# Patient Record
Sex: MALE | Race: WHITE | NOT HISPANIC OR LATINO | Employment: STUDENT | URBAN - METROPOLITAN AREA
[De-identification: names, ages, dates, MRNs, and addresses within clinical notes are randomized per-mention and may not be internally consistent; named-entity substitution may affect disease eponyms.]

---

## 2024-10-18 ENCOUNTER — OFFICE VISIT (OUTPATIENT)
Dept: PODIATRY | Facility: CLINIC | Age: 24
End: 2024-10-18
Payer: MEDICAID

## 2024-10-18 VITALS
RESPIRATION RATE: 16 BRPM | WEIGHT: 295 LBS | HEART RATE: 89 BPM | HEIGHT: 76 IN | BODY MASS INDEX: 35.92 KG/M2 | OXYGEN SATURATION: 97 %

## 2024-10-18 DIAGNOSIS — L60.0 INGROWN TOENAIL OF BOTH FEET: Primary | ICD-10-CM

## 2024-10-18 DIAGNOSIS — S90.423S: ICD-10-CM

## 2024-10-18 RX ORDER — SERTRALINE HYDROCHLORIDE 100 MG/1
100 TABLET, FILM COATED ORAL EVERY EVENING
COMMUNITY
Start: 2024-09-28

## 2024-10-21 NOTE — PROGRESS NOTES
"10/18/2024  Foot and Ankle Surgery - Established Patient/Follow-up  Provider: JOHN Balderas   Location: [facility]    Subjective:  Joey Rowley is a 24 y.o. male.     Chief Complaint   Patient presents with    Left Foot - Ingrown Toenail, Initial Evaluation    Right Foot - Ingrown Toenail, Initial Evaluation    Initial Evaluation     PCP: Ernesto Noel last visit 6/16/2024       History of Present Illness  The patient is a 24 year old male who presents for evaluation of toenail problems.    He reports experiencing issues with his toenails, suspecting them to be ingrown. He recalls a similar issue seven years ago, which was addressed by a podiatrist who removed the problematic nails. He has been attempting to alleviate the pressure on his toes by cutting down the sides of his nails. His right toe is causing him more discomfort than the left, although he describes the pain as mild.    Additionally, he has developed blisters on his toes, which he attributes to a recent long walk in new shoes. He feels the blisters have healed.     He also mentions having flat feet and frequent ankle sprains.       Allergies   Allergen Reactions    Phenytoin Hives       Current Outpatient Medications on File Prior to Visit   Medication Sig Dispense Refill    Cetirizine HCl 10 MG capsule Take  by mouth.      sertraline (ZOLOFT) 100 MG tablet Take 1 tablet by mouth Every Evening.       No current facility-administered medications on file prior to visit.       Objective   Pulse 89   Resp 16   Ht 193 cm (76\")   Wt 134 kg (295 lb)   SpO2 97%   BMI 35.91 kg/m²     Foot/Ankle Exam    GENERAL  Appearance:  appears stated age  Orientation:  AAOx3  Affect:  appropriate  Gait:  unimpaired  Assistance:  independent  Right shoe gear: casual shoe and sock  Left shoe gear: casual shoe and sock    VASCULAR     Right Foot Vascularity   Dorsalis pedis:  2+  Skin temperature:  warm  Edema grading:  None  CFT:  < 3 " seconds  Pedal hair growth:  Present  Varicosities:  none     Left Foot Vascularity   Dorsalis pedis:  2+  Skin temperature:  warm  Edema grading:  None  CFT:  < 3 seconds  Pedal hair growth:  Present  Varicosities:  none     NEUROLOGIC     Right Foot Neurologic   Light touch sensation: normal     Left Foot Neurologic   Light touch sensation: normal    MUSCULOSKELETAL     Right Foot Musculoskeletal   Ecchymosis:  none  Tenderness:  none       Left Foot Musculoskeletal   Ecchymosis:  none  Tenderness:  toenail problem    DERMATOLOGIC      Right Foot Dermatologic   Skin  Positive for skin changes.   Nails  1.  Normal.     Left Foot Dermatologic   Skin  Positive for skin changes.   Nails  1.  Normal.  Physical Exam         Results       Assessment & Plan   Diagnoses and all orders for this visit:    1. Ingrown toenail of both feet (Primary)    2. Non-thermal blister of great toe, unspecified laterality, sequela      Assessment & Plan  1. Ingrown toenail.  He presents with symptoms of a mild ingrown toenail on the right lateral border of the great toenail. The pathophysiology of ingrown toenails and treatment options were discussed. Conservative treatment was recommended, including daily Epsom salt soaks for a week and ensuring nails are cut straight across without digging into the sides. If symptoms do not improve, a partial nail removal with acid application to the nail bed will be considered. Risks and benefits of the procedure were discussed.    2. Blisters on toes.  He reports recent blisters on his toes, likely due to footwear. Blisters have healed and reabsorbed. It was recommended to use a good moisturizer like Aquaphor and consider wider shoes if the issue persists.    Follow-up  Return in 2 to 3 weeks for follow up.       No orders of the defined types were placed in this encounter.         Patient or patient representative verbalized consent for the use of Ambient Listening during the visit with  Mony Holland  JOHN Collado for chart documentation. 10/21/2024  17:41 EDT

## 2024-10-31 ENCOUNTER — TELEPHONE (OUTPATIENT)
Age: 24
End: 2024-10-31

## 2024-10-31 NOTE — TELEPHONE ENCOUNTER
Caller: Joey Rowley    Relationship to patient: Self    Best call back number:     Chief complaint: 2-3 WEEK FOLLOW UP: INGROWN TOENAIL     Type of visit: FOLLOW UP    Requested date: ASAP - AROUND 11/8/24    If rescheduling, when is the original appointment: 11/8/24     Additional notes: PT HAS A SCHEDULING CONFLICT AND CAN'T MAKE 11/8/24 APPT

## 2024-11-05 ENCOUNTER — OFFICE VISIT (OUTPATIENT)
Dept: PODIATRY | Facility: CLINIC | Age: 24
End: 2024-11-05
Payer: MEDICAID

## 2024-11-05 VITALS
WEIGHT: 295 LBS | HEART RATE: 88 BPM | BODY MASS INDEX: 35.92 KG/M2 | RESPIRATION RATE: 18 BRPM | HEIGHT: 76 IN | OXYGEN SATURATION: 97 %

## 2024-11-05 DIAGNOSIS — M79.674 PAIN OF RIGHT GREAT TOE: ICD-10-CM

## 2024-11-05 DIAGNOSIS — L60.0 INGROWN TOENAIL OF BOTH FEET: Primary | ICD-10-CM

## 2024-11-05 NOTE — PROGRESS NOTES
"11/05/2024  Foot and Ankle Surgery - Established Patient/Follow-up  Provider: JOHN Balderas   Location: [facility]    Subjective:  Joey Rowley is a 24 y.o. male.     Chief Complaint   Patient presents with    Left Foot - Follow-up, Ingrown Toenail    Right Foot - Follow-up, Ingrown Toenail    Follow-up     PCP: Ernesto Noel last visit 6/16/2024           History of Present Illness  The patient is here today for follow-up of ingrown toenail to his right great toe.    He reports experiencing pain in his right great toe, which has been a persistent issue. He has previously undergone two procedures for this condition.       Allergies   Allergen Reactions    Phenytoin Hives       Current Outpatient Medications on File Prior to Visit   Medication Sig Dispense Refill    Cetirizine HCl 10 MG capsule Take  by mouth.      sertraline (ZOLOFT) 100 MG tablet Take 1 tablet by mouth Every Evening.       No current facility-administered medications on file prior to visit.       Objective   Pulse 88   Resp 18   Ht 193 cm (76\")   Wt 134 kg (295 lb)   SpO2 97%   BMI 35.91 kg/m²     Foot/Ankle Exam  Physical Exam         Results       Assessment & Plan   Diagnoses and all orders for this visit:    1. Ingrown toenail of both feet (Primary)    2. Pain of right great toe      Assessment & Plan  1. Right great toe lateral border with ingrown toenail symptoms.  He has tried conservative treatment options, including soaking, but continues to experience pain. A partial nail removal with chemical matrixectomy using phenol to the right great toe at the lateral border is recommended and agreed upon. Post-procedure care includes keeping the toe wrapped for 24 hours, avoiding ocean, lake, or pool water, and managing pain with Tylenol or ibuprofen. A post-procedure care sheet will be provided.    Partial Nail Avulsion with Chemical Matrixectomy: right hallux    Consent and time out was performed before proceeding " with the procedure.  Right hallux was cleansed with alcohol and the digit was blocked in a ring block fashion utilizing 8 mL of 1% lidocaine plain.  A digital tourniquet was applied to the digit.  The lateral border was lifted from the nail bed and nail margin with a [place].  An English Anvil was used to split the nail proximal to the eponychium extending into the nail matrix.  The offending nail margin was grasped with a hemostat and removed.  The nail border was explored with a curette to ensure adequate removal.  Matrixectomy was performed utilizing three 30 second applications of 89% phenol on a micro-tip applicator.  The area was then rinsed with alcohol to dilute the phenol. The nail fold and exposed nail bed were flushed with normal saline.  Antibiotic ointment followed by sterile compressive dressings were then applied.  The digital tourniquot was removed.  No excessive bleeding or complications were evident.  The patient tolerated procedure and local anesthetic well.     Follow up prn           No orders of the defined types were placed in this encounter.         Patient or patient representative verbalized consent for the use of Ambient Listening during the visit with  JOHN Soriano for chart documentation. 11/5/2024  16:35 EST